# Patient Record
Sex: FEMALE | Race: WHITE | NOT HISPANIC OR LATINO | Employment: STUDENT | URBAN - METROPOLITAN AREA
[De-identification: names, ages, dates, MRNs, and addresses within clinical notes are randomized per-mention and may not be internally consistent; named-entity substitution may affect disease eponyms.]

---

## 2023-07-23 ENCOUNTER — HOSPITAL ENCOUNTER (EMERGENCY)
Facility: HOSPITAL | Age: 18
Discharge: HOME | End: 2023-07-24
Attending: EMERGENCY MEDICINE | Admitting: EMERGENCY MEDICINE
Payer: COMMERCIAL

## 2023-07-23 VITALS
WEIGHT: 140 LBS | HEART RATE: 82 BPM | TEMPERATURE: 98.7 F | SYSTOLIC BLOOD PRESSURE: 118 MMHG | RESPIRATION RATE: 18 BRPM | BODY MASS INDEX: 21.22 KG/M2 | DIASTOLIC BLOOD PRESSURE: 80 MMHG | HEIGHT: 68 IN | OXYGEN SATURATION: 98 %

## 2023-07-23 DIAGNOSIS — Z51.89 VISIT FOR WOUND CHECK: Primary | ICD-10-CM

## 2023-07-23 PROCEDURE — 99283 EMERGENCY DEPT VISIT LOW MDM: CPT

## 2023-07-23 RX ORDER — CEPHALEXIN 500 MG/1
500 CAPSULE ORAL 3 TIMES DAILY
Qty: 21 CAPSULE | Refills: 0 | Status: SHIPPED | OUTPATIENT
Start: 2023-07-23 | End: 2023-07-30

## 2023-07-24 NOTE — ED PROVIDER NOTES
"Subjective  History of Present Illness:    Chief Complaint: Wound check  History of Present Illness: 17-year-old female presents to the emergency department for wound check, she was seen at  emergency department early this morning, after injuring her left foot, she states that she had a laceration on the bottom of her foot, but she is not sure what she cut it on.  Reviewed the records at , and it shows that the patient had an x-ray that was negative for foreign body, and laceration repair with multiple sutures to the bottom of her left foot.  She states that the foot is oozing blood at times, and it looks like part of the wound is open.  Onset: Patient was seen at  emergency department at 1:30 AM July 23  Duration: 1 day  Exacerbating / Alleviating factors: Pain with movement, losing  Associated symptoms: Mild oozing in the bandage      Nurses Notes reviewed and agree, including vitals, allergies, social history and prior medical history.     REVIEW OF SYSTEMS: All systems reviewed and not pertinent unless noted.    Review of Systems   Skin:         Laceration repair left foot   All other systems reviewed and are negative.    No past medical history on file.    Allergies:    Erythromycin      No past surgical history on file.      Social History     Socioeconomic History    Marital status: Single         No family history on file.    Objective  Physical Exam:  /80 (BP Location: Left arm, Patient Position: Sitting)   Pulse 82   Temp 98.7 °F (37.1 °C) (Oral)   Resp 18   Ht 172.7 cm (68\")   Wt 63.5 kg (140 lb)   SpO2 98%   BMI 21.29 kg/m²      Physical Exam  Vitals and nursing note reviewed.   Constitutional:       Appearance: She is well-developed.   Pulmonary:      Effort: Pulmonary effort is normal.   Musculoskeletal:         General: Normal range of motion.      Cervical back: Normal range of motion and neck supple.        Feet:       Comments: Laceration repair sole of left foot, multiple " stitches, small area of the skin looks macerated slightly, so the stitch pulled through the skin   Skin:     General: Skin is warm and dry.   Neurological:      Mental Status: She is alert and oriented to person, place, and time.      Deep Tendon Reflexes: Reflexes are normal and symmetric.         Procedures    ED Course:         Lab Results (last 24 hours)       ** No results found for the last 24 hours. **             XR Foot 3+ View Left    Result Date: 7/23/2023  CLINICAL INDICATION: foot lac TECHNIQUE: XR FOOT LEFT 3+ VIEWS COMPARISON: None. FINDINGS: No fracture or dislocation. Soft tissue defect of the lateral plantar surface of the foot, which likely represents laceration. No radiopaque foreign body.    Impression: Soft tissue defect of the lateral plantar surface of the foot, which likely represents laceration. No radiopaque foreign body. CRITICAL RESULT:   No. COMMUNICATION: Per this written report. Preliminary report signed by Shon Geronimo MD on 7/23/2023 3:54 AM By electronically signing this report, I, the attending physician, attest that I have personally reviewed the images/data for the above examination(s) and agree with the final edited report. Drafted by Shon Geronimo MD on 7/23/2023 3:45 AM Final report signed by Saul Cuadra MD on 7/23/2023 4:14 AM        Medical Decision Making  16-year-old female presents for a wound check, see if sutures placed on her wound approximately 10 hours ago at an outside facility.  She was worried because the wound was oozing to the bandage, and she had an area that the stitch pulled through the skin.  I examined the wound, she does have areas of the skin that the suture did pull through, and mild oozing she also had some mild tenderness palpation.  I reviewed and summarized previous medical records from the outside facility, she had an x-ray that was negative for foreign body, and sutures were placed.  The wound was cleaned here in the ED, and rebandaged and she  was given instructions on wound care to take home.  She was written Keflex, due to the nature of the injury being on the sole of the foot, and she is unsure what she may have cut it against, this potentially could have been a dirty wound.  She is otherwise cleared for discharge.    Problems Addressed:  Visit for wound check: complicated acute illness or injury    Risk  Prescription drug management.          Final diagnoses:   Visit for wound check          Mega Xiao Jr., PA-C  07/23/23 [id number]

## 2024-07-26 ENCOUNTER — OFFICE VISIT (OUTPATIENT)
Dept: OBSTETRICS AND GYNECOLOGY | Facility: CLINIC | Age: 19
End: 2024-07-26
Payer: COMMERCIAL

## 2024-07-26 VITALS
SYSTOLIC BLOOD PRESSURE: 98 MMHG | WEIGHT: 137 LBS | HEIGHT: 68 IN | BODY MASS INDEX: 20.76 KG/M2 | DIASTOLIC BLOOD PRESSURE: 62 MMHG

## 2024-07-26 DIAGNOSIS — Z11.3 SCREEN FOR STD (SEXUALLY TRANSMITTED DISEASE): Primary | ICD-10-CM

## 2024-07-26 DIAGNOSIS — Z11.8 SCREENING FOR CHLAMYDIAL DISEASE: ICD-10-CM

## 2024-07-26 DIAGNOSIS — Z30.42 DEPO-PROVERA CONTRACEPTIVE STATUS: ICD-10-CM

## 2024-07-26 PROCEDURE — 99203 OFFICE O/P NEW LOW 30 MIN: CPT | Performed by: OBSTETRICS & GYNECOLOGY

## 2024-07-26 RX ORDER — ACETAMINOPHEN 500 MG
500 TABLET ORAL EVERY 6 HOURS PRN
COMMUNITY

## 2024-07-26 NOTE — PROGRESS NOTES
Gynecologic Annual Exam Note        Gynecologic Exam        Subjective     HPI  Nat Covington is a 18 y.o.  female who presents for annual well woman exam as a new patient. Patient's last menstrual period was 2024 (exact date). Her periods occur every month, lasting 4 days.  The flow is heavy-light. She reports dysmenorrhea is moderate occurring first 1-2 days of flow. Marital Status: single.  She is sexually active. She has had new partners.. STD testing recommendations have been explained to the patient and she does desire STD testing.    The patient would like to discuss the following complaints today: none    Additional OB/GYN History   contraceptive methods: Depo-Provera injections  Desires to: start contraception-would like IUD  Thromboembolic Disease: none  History of migraines: yes with aura  Age of menarche: 10    History of STD: yes CHLAMYDIA    Last Pap : n/a.   Last Completed Pap Smear       This patient has no relevant Health Maintenance data.             History of abnormal Pap smear:  n/a  Gardasil status:completed  Family history of uterine, colon, breast, or ovarian cancer: no  Performs monthly Self-Breast Exam: yes  Exercises Regularly:no  Feelings of Anxiety or Depression: yes - both  Tobacco Usage?: Yes Nat Covington  reports that she has never smoked. She does not have any smokeless tobacco history on file. I have educated her on the risk of diseases from using tobacco products such as cancer, COPD, heart disease, and reproductive problems.     I advised her to quit and she is willing to quit. We have discussed the following method/s for tobacco cessation:  Counseling.  Together we have set a quit date for  does not want to set .  She will follow up with me in 1 month or sooner to check on her progress.    I spent 3  minutes counseling the patient.            Current Outpatient Medications:     acetaminophen (TYLENOL) 500 MG tablet, Take 1 tablet by mouth Every 6 (Six) Hours As  "Needed for Mild Pain., Disp: , Rfl:      Patient denies the need for medication refills today.    OB History          0    Para   0    Term   0       0    AB   0    Living   0         SAB   0    IAB   0    Ectopic   0    Molar   0    Multiple   0    Live Births   0                Health Maintenance   Topic Date Due    HPV VACCINES (1 - 3-dose series) Never done    TDAP/TD VACCINES (1 - Tdap) Never done    COVID-19 Vaccine ( - - season) 2023    HEPATITIS C SCREENING  Never done    ANNUAL PHYSICAL  Never done    CHLAMYDIA SCREENING  Never done    INFLUENZA VACCINE  2024    Pneumococcal Vaccine 0-64  Aged Out    MENINGOCOCCAL VACCINE  Aged Out       Past Medical History:   Diagnosis Date    Anxiety     Depression     History of chlamydia         Past Surgical History:   Procedure Laterality Date    ELBOW PROCEDURE      right, fractured elbow       The additional following portions of the patient's history were reviewed and updated as appropriate: allergies, current medications, past family history, past medical history, past social history, past surgical history, and problem list.    Review of Systems   Constitutional: Negative.    HENT: Negative.     Eyes: Negative.    Respiratory: Negative.     Cardiovascular: Negative.    Gastrointestinal: Negative.    Endocrine: Negative.    Genitourinary: Negative.    Musculoskeletal: Negative.    Skin: Negative.    Allergic/Immunologic: Negative.    Neurological: Negative.    Hematological: Negative.    Psychiatric/Behavioral:  Positive for depressed mood. The patient is nervous/anxious.          I have reviewed and agree with the HPI, ROS, and historical information as entered above.   João Yan MD          Objective   BP 98/62 (BP Location: Right arm, Patient Position: Sitting, Cuff Size: Adult)   Ht 172.7 cm (68\")   Wt 62.1 kg (137 lb)   LMP 2024 (Exact Date)   Breastfeeding No   BMI 20.83 kg/m²     Physical " Exam  Constitutional:       Appearance: Normal appearance. She is normal weight.   HENT:      Head: Normocephalic and atraumatic.   Pulmonary:      Effort: Pulmonary effort is normal.   Neurological:      Mental Status: She is alert and oriented to person, place, and time.   Psychiatric:         Behavior: Behavior normal.            Assessment and Plan    Problem List Items Addressed This Visit       Depo-Provera contraceptive status    Overview     7/26/2024 patient states that she has been on Depo-Provera for approximately 5 years and wishes to switch contraception.  Initially was amenorrheic but now with monthly menses.  Her last Depo Provera injection was approximately 3 months ago.          Other Visit Diagnoses       Screen for STD (sexually transmitted disease)    -  Primary    Screening for chlamydial disease                GYN annual well woman exam.  18 years of age  Reviewed pap guidelines.  Pap smear screening beginning at age 21.  Chlamydia screening by urine today.  Contraceptive counseling.  Wishes to switch from Depo-Provera.  Discussed risk of bone loss with greater than 5 years of Depo-Provera use.  Discussed increased risk of pain with IUD and insertion and pain and bleeding post insertion with young age even with smaller IUDs.  May be better candidate for Nexplanon.  Pamphlets given.  Patient will call with onset of menses for insertion.  Encouraged use of condoms for STD prevention.  Reviewed monthly self breast exams.  Instructed to call with lumps, pain, or breast discharge.    Reviewed exercise as a preventative health measures.   Reccommended Flu Vaccine in Fall of each year.  Return for Call with onset of menses for IUD insertion.    João Yan MD  07/26/2024

## 2024-07-29 LAB
C TRACH RRNA SPEC QL NAA+PROBE: NEGATIVE
N GONORRHOEA RRNA SPEC QL NAA+PROBE: NEGATIVE

## 2024-08-05 ENCOUNTER — TELEPHONE (OUTPATIENT)
Dept: OBSTETRICS AND GYNECOLOGY | Facility: CLINIC | Age: 19
End: 2024-08-05
Payer: COMMERCIAL

## 2024-09-23 ENCOUNTER — TRANSCRIBE ORDERS (OUTPATIENT)
Dept: LAB | Facility: HOSPITAL | Age: 19
End: 2024-09-23
Payer: COMMERCIAL

## 2024-09-23 ENCOUNTER — LAB (OUTPATIENT)
Dept: LAB | Facility: HOSPITAL | Age: 19
End: 2024-09-23
Payer: COMMERCIAL

## 2024-09-23 DIAGNOSIS — Z30.430 VISIT FOR INSERTION OF INTRAUTERINE DEVICE: ICD-10-CM

## 2024-09-23 DIAGNOSIS — Z30.430 VISIT FOR INSERTION OF INTRAUTERINE DEVICE: Primary | ICD-10-CM

## 2024-09-23 LAB — PROGEST SERPL-MCNC: 6.49 NG/ML

## 2024-09-23 PROCEDURE — 84702 CHORIONIC GONADOTROPIN TEST: CPT

## 2024-09-23 PROCEDURE — 36415 COLL VENOUS BLD VENIPUNCTURE: CPT

## 2024-09-23 PROCEDURE — 84144 ASSAY OF PROGESTERONE: CPT

## 2024-09-24 LAB — HCG INTACT+B SERPL-ACNC: 19 MIU/ML

## 2024-09-25 ENCOUNTER — LAB (OUTPATIENT)
Dept: LAB | Facility: HOSPITAL | Age: 19
End: 2024-09-25
Payer: COMMERCIAL

## 2024-09-25 ENCOUNTER — TRANSCRIBE ORDERS (OUTPATIENT)
Dept: LAB | Facility: HOSPITAL | Age: 19
End: 2024-09-25
Payer: COMMERCIAL

## 2024-09-25 DIAGNOSIS — Z32.01 PREGNANCY EXAMINATION OR TEST, POSITIVE RESULT: Primary | ICD-10-CM

## 2024-09-25 DIAGNOSIS — Z32.01 PREGNANCY EXAMINATION OR TEST, POSITIVE RESULT: ICD-10-CM

## 2024-09-25 LAB
HCG INTACT+B SERPL-ACNC: 32.7 MIU/ML
PROGEST SERPL-MCNC: 5.29 NG/ML

## 2024-09-25 PROCEDURE — 84702 CHORIONIC GONADOTROPIN TEST: CPT

## 2024-09-25 PROCEDURE — 36415 COLL VENOUS BLD VENIPUNCTURE: CPT

## 2024-09-25 PROCEDURE — 84144 ASSAY OF PROGESTERONE: CPT

## 2024-09-27 ENCOUNTER — TRANSCRIBE ORDERS (OUTPATIENT)
Dept: LAB | Facility: HOSPITAL | Age: 19
End: 2024-09-27
Payer: COMMERCIAL

## 2024-09-27 ENCOUNTER — LAB (OUTPATIENT)
Dept: LAB | Facility: HOSPITAL | Age: 19
End: 2024-09-27
Payer: COMMERCIAL

## 2024-09-27 DIAGNOSIS — Z32.01 PREGNANCY EXAMINATION OR TEST, POSITIVE RESULT: Primary | ICD-10-CM

## 2024-09-27 LAB — HCG INTACT+B SERPL-ACNC: 41.7 MIU/ML

## 2024-09-27 PROCEDURE — 84702 CHORIONIC GONADOTROPIN TEST: CPT | Performed by: OBSTETRICS & GYNECOLOGY

## 2024-09-27 PROCEDURE — 36415 COLL VENOUS BLD VENIPUNCTURE: CPT | Performed by: OBSTETRICS & GYNECOLOGY

## 2024-10-01 ENCOUNTER — LAB (OUTPATIENT)
Dept: LAB | Facility: HOSPITAL | Age: 19
End: 2024-10-01
Payer: COMMERCIAL

## 2024-10-01 ENCOUNTER — TRANSCRIBE ORDERS (OUTPATIENT)
Dept: LAB | Facility: HOSPITAL | Age: 19
End: 2024-10-01
Payer: COMMERCIAL

## 2024-10-01 DIAGNOSIS — Z32.01 PREGNANCY EXAMINATION OR TEST, POSITIVE RESULT: Primary | ICD-10-CM

## 2024-10-01 DIAGNOSIS — Z32.01 PREGNANCY EXAMINATION OR TEST, POSITIVE RESULT: ICD-10-CM

## 2024-10-01 LAB — HCG INTACT+B SERPL-ACNC: 4.4 MIU/ML

## 2024-10-01 PROCEDURE — 36415 COLL VENOUS BLD VENIPUNCTURE: CPT

## 2024-10-01 PROCEDURE — 84702 CHORIONIC GONADOTROPIN TEST: CPT
